# Patient Record
Sex: MALE | Race: WHITE | NOT HISPANIC OR LATINO | Employment: UNEMPLOYED | URBAN - METROPOLITAN AREA
[De-identification: names, ages, dates, MRNs, and addresses within clinical notes are randomized per-mention and may not be internally consistent; named-entity substitution may affect disease eponyms.]

---

## 2018-04-08 ENCOUNTER — HOSPITAL ENCOUNTER (EMERGENCY)
Facility: MEDICAL CENTER | Age: 48
End: 2018-04-08
Attending: EMERGENCY MEDICINE

## 2018-04-08 VITALS
DIASTOLIC BLOOD PRESSURE: 89 MMHG | WEIGHT: 211.64 LBS | BODY MASS INDEX: 28.05 KG/M2 | OXYGEN SATURATION: 95 % | HEIGHT: 73 IN | RESPIRATION RATE: 18 BRPM | TEMPERATURE: 99.1 F | SYSTOLIC BLOOD PRESSURE: 139 MMHG | HEART RATE: 78 BPM

## 2018-04-08 DIAGNOSIS — I10 HYPERTENSION, UNSPECIFIED TYPE: ICD-10-CM

## 2018-04-08 DIAGNOSIS — F10.930 ALCOHOL WITHDRAWAL SYNDROME WITHOUT COMPLICATION (HCC): ICD-10-CM

## 2018-04-08 LAB
ALBUMIN SERPL BCP-MCNC: 4.2 G/DL (ref 3.2–4.9)
ALBUMIN/GLOB SERPL: 1.4 G/DL
ALP SERPL-CCNC: 47 U/L (ref 30–99)
ALT SERPL-CCNC: 57 U/L (ref 2–50)
ANION GAP SERPL CALC-SCNC: 22 MMOL/L (ref 0–11.9)
APTT PPP: 22.9 SEC (ref 24.7–36)
AST SERPL-CCNC: 63 U/L (ref 12–45)
BASOPHILS # BLD AUTO: 0.3 % (ref 0–1.8)
BASOPHILS # BLD: 0.01 K/UL (ref 0–0.12)
BILIRUB SERPL-MCNC: 1.5 MG/DL (ref 0.1–1.5)
BUN SERPL-MCNC: 10 MG/DL (ref 8–22)
CALCIUM SERPL-MCNC: 8.7 MG/DL (ref 8.5–10.5)
CHLORIDE SERPL-SCNC: 93 MMOL/L (ref 96–112)
CO2 SERPL-SCNC: 15 MMOL/L (ref 20–33)
CREAT SERPL-MCNC: 0.86 MG/DL (ref 0.5–1.4)
EKG IMPRESSION: NORMAL
EOSINOPHIL # BLD AUTO: 0.02 K/UL (ref 0–0.51)
EOSINOPHIL NFR BLD: 0.5 % (ref 0–6.9)
ERYTHROCYTE [DISTWIDTH] IN BLOOD BY AUTOMATED COUNT: 48.5 FL (ref 35.9–50)
ETHANOL BLD-MCNC: 0.01 G/DL
GLOBULIN SER CALC-MCNC: 3.1 G/DL (ref 1.9–3.5)
GLUCOSE SERPL-MCNC: 232 MG/DL (ref 65–99)
HCT VFR BLD AUTO: 44.1 % (ref 42–52)
HGB BLD-MCNC: 15.4 G/DL (ref 14–18)
IMM GRANULOCYTES # BLD AUTO: 0.01 K/UL (ref 0–0.11)
IMM GRANULOCYTES NFR BLD AUTO: 0.3 % (ref 0–0.9)
INR PPP: 1.14 (ref 0.87–1.13)
LIPASE SERPL-CCNC: 16 U/L (ref 11–82)
LYMPHOCYTES # BLD AUTO: 0.6 K/UL (ref 1–4.8)
LYMPHOCYTES NFR BLD: 16.2 % (ref 22–41)
MCH RBC QN AUTO: 36.2 PG (ref 27–33)
MCHC RBC AUTO-ENTMCNC: 34.9 G/DL (ref 33.7–35.3)
MCV RBC AUTO: 103.5 FL (ref 81.4–97.8)
MONOCYTES # BLD AUTO: 0.33 K/UL (ref 0–0.85)
MONOCYTES NFR BLD AUTO: 8.9 % (ref 0–13.4)
NEUTROPHILS # BLD AUTO: 2.73 K/UL (ref 1.82–7.42)
NEUTROPHILS NFR BLD: 73.8 % (ref 44–72)
NRBC # BLD AUTO: 0 K/UL
NRBC BLD-RTO: 0 /100 WBC
PLATELET # BLD AUTO: 84 K/UL (ref 164–446)
PMV BLD AUTO: 11.1 FL (ref 9–12.9)
POTASSIUM SERPL-SCNC: 3.7 MMOL/L (ref 3.6–5.5)
PROT SERPL-MCNC: 7.3 G/DL (ref 6–8.2)
PROTHROMBIN TIME: 14.3 SEC (ref 12–14.6)
RBC # BLD AUTO: 4.26 M/UL (ref 4.7–6.1)
SODIUM SERPL-SCNC: 130 MMOL/L (ref 135–145)
TROPONIN I SERPL-MCNC: <0.01 NG/ML (ref 0–0.04)
WBC # BLD AUTO: 3.7 K/UL (ref 4.8–10.8)

## 2018-04-08 PROCEDURE — 99284 EMERGENCY DEPT VISIT MOD MDM: CPT

## 2018-04-08 PROCEDURE — 80053 COMPREHEN METABOLIC PANEL: CPT

## 2018-04-08 PROCEDURE — 93005 ELECTROCARDIOGRAM TRACING: CPT | Performed by: EMERGENCY MEDICINE

## 2018-04-08 PROCEDURE — 36415 COLL VENOUS BLD VENIPUNCTURE: CPT

## 2018-04-08 PROCEDURE — 85730 THROMBOPLASTIN TIME PARTIAL: CPT

## 2018-04-08 PROCEDURE — 99285 EMERGENCY DEPT VISIT HI MDM: CPT

## 2018-04-08 PROCEDURE — 83690 ASSAY OF LIPASE: CPT

## 2018-04-08 PROCEDURE — 700111 HCHG RX REV CODE 636 W/ 250 OVERRIDE (IP): Performed by: EMERGENCY MEDICINE

## 2018-04-08 PROCEDURE — 85610 PROTHROMBIN TIME: CPT

## 2018-04-08 PROCEDURE — 93005 ELECTROCARDIOGRAM TRACING: CPT

## 2018-04-08 PROCEDURE — A9270 NON-COVERED ITEM OR SERVICE: HCPCS | Performed by: EMERGENCY MEDICINE

## 2018-04-08 PROCEDURE — 84484 ASSAY OF TROPONIN QUANT: CPT

## 2018-04-08 PROCEDURE — 700102 HCHG RX REV CODE 250 W/ 637 OVERRIDE(OP): Performed by: EMERGENCY MEDICINE

## 2018-04-08 PROCEDURE — 80307 DRUG TEST PRSMV CHEM ANLYZR: CPT

## 2018-04-08 PROCEDURE — 96374 THER/PROPH/DIAG INJ IV PUSH: CPT

## 2018-04-08 PROCEDURE — 85025 COMPLETE CBC W/AUTO DIFF WBC: CPT

## 2018-04-08 RX ORDER — LORAZEPAM 1 MG/1
1 TABLET ORAL ONCE
Status: COMPLETED | OUTPATIENT
Start: 2018-04-08 | End: 2018-04-08

## 2018-04-08 RX ORDER — LORAZEPAM 2 MG/ML
1 INJECTION INTRAMUSCULAR ONCE
Status: COMPLETED | OUTPATIENT
Start: 2018-04-08 | End: 2018-04-08

## 2018-04-08 RX ADMIN — LORAZEPAM 1 MG: 2 INJECTION INTRAMUSCULAR; INTRAVENOUS at 13:55

## 2018-04-08 RX ADMIN — LORAZEPAM 1 MG: 1 TABLET ORAL at 15:27

## 2018-04-08 ASSESSMENT — LIFESTYLE VARIABLES
HOW MANY TIMES IN THE PAST YEAR HAVE YOU HAD 5 OR MORE DRINKS IN A DAY: 4
DO YOU DRINK ALCOHOL: YES
HAVE PEOPLE ANNOYED YOU BY CRITICIZING YOUR DRINKING: NO
HAVE YOU EVER FELT YOU SHOULD CUT DOWN ON YOUR DRINKING: NO
TOTAL SCORE: 0
CONSUMPTION TOTAL: POSITIVE
ON A TYPICAL DAY WHEN YOU DRINK ALCOHOL HOW MANY DRINKS DO YOU HAVE: 3
TOTAL SCORE: 0
TOTAL SCORE: 0
AVERAGE NUMBER OF DAYS PER WEEK YOU HAVE A DRINK CONTAINING ALCOHOL: 7
EVER FELT BAD OR GUILTY ABOUT YOUR DRINKING: NO
EVER HAD A DRINK FIRST THING IN THE MORNING TO STEADY YOUR NERVES TO GET RID OF A HANGOVER: NO

## 2018-04-08 NOTE — ED PROVIDER NOTES
ED Provider Note    Scribed for Rony Simpson M.D. by Henri Sanderson. 4/8/2018  1:10 PM    Means of arrival: EMS  History limited by: None    CHIEF COMPLAINT  Chief Complaint   Patient presents with   • Syncope       HPI  Keegan Christianson is a 48 y.o. male who presents to the ED for evaluation after after he experienced a syncopal ground level fall while walking in the hallway at a hotel this afternoon. The patient fell striking his head on an unknown object and the fall was not witnessed. He has experienced syncope in the past after he attempted to quite drinking alcohol. Also, the patient reportedly did not sleep for the last 36 hours secondary to generalized pain. The patient reports associated lightheadedness prior to arrival follow by another episode fo near syncope prior to arrival here, shortness of breath yesterday that has resolved, and general malaise. He denies vomiting, diarrhea, new skin rash, chest pain, blurred vision, or focal weakness. Patient states that he normally drinks about 2-3 cocktails a day, but state that he stopped consuming alcohol two days ago. He reports that he quit drinking because he was not feeling right. The patient denies a history of seizures.    REVIEW OF SYSTEMS  CONSTITUTIONAL:  Positive for malaise. Denies fever, chills, weight gain/loss, or weakness.  EYES:  Denies blurred vision.  CARDIOVASCULAR:  Denies chest pain  RESPIRATORY:  Positive for shortness of breath  GI:  Denies vomiting and diarrhea.  MUSCULOSKELETAL:  Positive for fall and generalized pain.  SKIN:  No rash  NEUROLOGIC:  Positive for syncope, lightheadedness, and head trauma. Denies focal weakness  PSYCHIATRIC:  Positive for difficulty sleeping.  All other system negative  C    PAST MEDICAL HISTORY  Past Medical History:   Diagnosis Date   • Hypertension    • Seizure (CMS-HCC)        FAMILY HISTORY  History reviewed. No pertinent family history.    SOCIAL HISTORY   reports that he has been smoking.  He  "has been smoking about 2.00 packs per day. He has never used smokeless tobacco. He reports that he drinks alcohol. He reports that he does not use drugs.    SURGICAL HISTORY  History reviewed. No pertinent surgical history.    CURRENT MEDICATIONS  No current facility-administered medications on file prior to encounter.      Current Outpatient Prescriptions on File Prior to Encounter   Medication Sig Dispense Refill   • amLODIPine (NORVASC) 10 MG Tab Take 1 Tab by mouth every day. 3 Tab 0       ALLERGIES  No Known Allergies    PHYSICAL EXAM  VITAL SIGNS: /98   Pulse 80   Temp 37.3 °C (99.1 °F)   Resp 16   Ht 1.854 m (6' 1\")   Wt 96 kg (211 lb 10.3 oz)   SpO2 93%   BMI 27.92 kg/m²      Constitutional: Patient is awake and alert. No acute respiratory distress. Well developed, Well nourished, Very shaky, tremulous, and hyperreflexic, speech is clear  HENT: Normocephalic, Well healed scar right side forehead and abrasion to the left side, Bilateral external ears normal, Oropharynx pink and dry with no exudates, Nose patent.  Eyes: PERRLA, EOMI, Sclera and conjunctiva are injected, No discharge.   Neck:  Supple no nuchal rigidity, no thyromegaly or mass. Non-tender  Cardiovascular: Heart is regular rate and rhythm no murmur, rub or thrill.   Thorax & Lungs: Chest is symmetrical, with good breath sounds. No wheezing or crackles. No respiratory distress, No chest tenderness.   Abdomen: Soft, No tenderness no hepatosplenomegaly there is no guarding or rebound, No masses, No pulsatile masses.   Skin: Warm, Dry, no petechia, purpura, or rash. Well healed scar right side forehead and abrasion to the left side  Back: Non tender with palpation, No CVA tenderness.   Extremities: No edema. Non tender.   Musculoskeletal: Good range of motion to wrists, elbows, shoulders, hips, knees, and ankles. Pulses 2+ radially and femorally. No gross deformities noted.   Neurologic: Alert & oriented to person, time, and place.  " Strength is 5 over 5 and symmetric in bilateral upper and lower extremities.  Sensory is intact to light touch to face, arms, and legs.  DTRs are symmetrical in biceps brachioradialis, patella and Achilles.   Psychiatric: Normal affect        LABS  Lab Results   Component Value Date    WBC 3.7 (L) 04/08/2018    HEMOGLOBIN 15.4 04/08/2018    HEMATOCRIT 44.1 04/08/2018    PLATELETCT 84 (L) 04/08/2018    SODIUM 130 (L) 04/08/2018    POTASSIUM 3.7 04/08/2018    CHLORIDE 93 (L) 04/08/2018    CO2 15 (L) 04/08/2018    BUN 10 04/08/2018    GLUCOSE 232 (H) 04/08/2018    ALTSGPT 57 (H) 04/08/2018    ASTSGOT 63 (H) 04/08/2018    INR 1.14 (H) 04/08/2018       All labs reviewed by me.    RADIOLOGY/PROCEDURES  No orders to display     The radiologist's interpretations of all radiological studies have been reviewed by me.     COURSE & MEDICAL DECISION MAKING  Pertinent Labs & Imaging studies reviewed. (See chart for details)    Differential diagnoses include but are not limited to alcohol withdrawal vs seizure medication side effect vs injection vs dehydration    1:10 PM - Patient seen and examined at bedside. Ordered EKG, urine drug screen, CBC with differential, CMP, Lipase, PTT, APTT, Troponin, diagnostic alcohol, estimated GFR, and EKG.  Patient will be medicated with lorazepam injection 1 mg for his symptoms.     Decision Making  Patient states he stopped drinking alcohol about 2 days ago was very shaky. He had a syncopal episode. The [place] as a marketing out of the elevator and then falling down to the ground but there is no seizure activity. He was immediately awake. In the emergency room is awake alert cooperative friendly a lot of tremulousness. I felt that he was an alcoholic withdrawal. We gave him IV fluids because he is dehydrated and 1 mg of Ativan IV. Follow this up with 1 mg of Ativan orally. The patient's shakiness improved substantially. He is able to ambulance to the bathroom without any  problems. I sat down with him in a long discussion about his alcohol use and abuse the fact that his liver enzymes are elevated I felt that he was in alcohol withdrawal. He does not want to stop drinking. He states he is from [place] and return back to [place] by bus or airplane since he does not drive. And he states he will talk to his doctor about his alcohol problems. Again he does not want to be admitted is to want further care. He states he like to be able to go back to the [facility] where he came from. At this time I feel that he is competent to make these decisions and shows opacity since the make these decisions. Again I've extracted him to stop drinking alcohol and seek care for his alcoholism. He also knows he cannot drive he states he does not drive at this time.  Believe his syncopal episode was due to his alcohol withdrawal and dehydration    Stable for discharge      FINAL IMPRESSION  1. Syncope  2. Alcohol withdrawal  PLAN  1. Stop drinking alcohol  2. Up with his primary care doctor in [place] within 2-3 days  3. To up with an alcohol treatment program or Alcoholics Anonymous today  4. Return to the emergency department if change in mind about stopping drinking alcohol or further care and evaluation  5. Return to the emergency department for increased pains, fevers, vomiting or change in condition.     IHenri (Scribe), am scribing for, and in the presence of, Rony Simpson M.D..    Electronically signed by: Henri Sanderson (Scribe), 4/8/2018    Rony ARANA M.D. personally performed the services described in this documentation, as scribed by Henri Sanderson in my presence, and it is both accurate and complete.    The note accurately reflects work and decisions made by me.  Rony Simpson  4/8/2018  4:27 PM

## 2018-04-08 NOTE — ED NOTES
Patient discharged with instructions for alcohol withdrawal with prescriptions x0 signs and symptoms explained to patient for reasons to return to emergency department, Patient is understanding and has no further questions. PT. Instructed to follow up with primary doctor in [place] when he returns home. Pt. Ambulated to front lobby with steady gait.

## 2018-04-08 NOTE — ED TRIAGE NOTES
"Bib ems for syncopal episode, and N/V/D x2 days. Pt. Currently A&Ox4, denies neck of back pain.Pt. Does report stopping drinking alcohol two days ago and reports at least a drinking history of \"at least three shots a day,\" and history of seizures but non compliant with medication, last seizure was one week ago. Pt. Has abrasion to left forehead from floor. Placed on cardiac monitor, bp cuff and pulse ox.  "

## 2018-04-08 NOTE — DISCHARGE INSTRUCTIONS
Alcohol Withdrawal  Introduction  When a person who drinks a lot of alcohol stops drinking, he or she may go through alcohol withdrawal. Alcohol withdrawal causes problems. It can make you feel:  · Tired (fatigued).  · Sad (depressed).  · Fearful (anxious).  · Grouchy (irritable).  · Not hungry.  · Sick to your stomach (nauseous).  · Shaky.  It can also make you have:  · Nightmares.  · Trouble sleeping.  · Trouble thinking clearly.  · Mood swings.  · Clammy skin.  · Very bad sweating.  · A very fast heartbeat.  · Shaking that you cannot control (tremor).  · Having a fever.  · A fit of movements that you cannot control (seizure).  · Confusion.  · Throwing up (vomiting).  · Feeling or seeing things that are not there (hallucinations).  Follow these instructions at home:  · Take medicines and vitamins only as told by your doctor.  · Do not drink alcohol.  · Have someone around in case you need help.  · Drink enough fluid to keep your pee (urine) clear or pale yellow.  · Think about joining a group to help you stop drinking.  Contact a doctor if:  · Your problems get worse.  · Your problems do not go away.  · You cannot eat or drink without throwing up.  · You are having a hard time not drinking alcohol.  · You cannot stop drinking alcohol.  Get help right away if:  · You feel your heart beating differently than usual.  · Your chest hurts.  · You have trouble breathing.  · You have very bad problems, like:  ¨ A fever.  ¨ A fit of movements that you cannot control.  ¨ Being very confused.  ¨ Feeling or seeing things that are not there.  This information is not intended to replace advice given to you by your health care provider. Make sure you discuss any questions you have with your health care provider.  Document Released: 06/05/2009 Document Revised: 05/25/2017 Document Reviewed: 10/06/2015  © 2017 [employer]vier      Chemical Dependency  Chemical dependency is an addiction to drugs or alcohol. It is characterized by the  repeated behavior of seeking out and using drugs and alcohol despite harmful consequences to the health and safety of ones self and others.   RISK FACTORS  There are certain situations or behaviors that increase a person's risk for chemical dependency. These include:  · A family history of chemical dependency.  · A history of mental health issues, including depression and anxiety.  · A home environment where drugs and alcohol are easily available to you.  · Drug or alcohol use at a young age.  SYMPTOMS   The following symptoms can indicate chemical dependency:  · Inability to limit the use of drugs or alcohol.  · Nausea, sweating, shakiness, and anxiety that occurs when alcohol or drugs are not being used.  · An increase in amount of drugs or alcohol that is necessary to get drunk or high.  People who experience these symptoms can assess their use of drugs and alcohol by asking themselves the following questions:  · Have you been told by friends or family that they are worried about your use of alcohol or drugs?  · Do friends and family ever tell you about things you did while drinking alcohol or using drugs that you do not remember?  · Do you lie about using alcohol or drugs or about the amounts you use?  · Do you have difficulty completing daily tasks unless you use alcohol or drugs?  · Is the level of your work or school performance lower because of your drug or alcohol use?  · Do you get sick from using drugs or alcohol but keep using anyway?  · Do you feel uncomfortable in social situations unless you use alcohol or drugs?  · Do you use drugs or alcohol to help forget problems?   An answer of yes to any of these questions may indicate chemical dependency. Professional evaluation is suggested.  This information is not intended to replace advice given to you by your health care provider. Make sure you discuss any questions you have with your health care provider.  Document Released: 12/12/2002 Document Revised:  03/11/2013 Document Reviewed: 08/24/2016  [employer]vier Interactive Patient Education © 2017 [employer]